# Patient Record
Sex: MALE | NOT HISPANIC OR LATINO | URBAN - METROPOLITAN AREA
[De-identification: names, ages, dates, MRNs, and addresses within clinical notes are randomized per-mention and may not be internally consistent; named-entity substitution may affect disease eponyms.]

---

## 2020-09-21 ENCOUNTER — APPOINTMENT (RX ONLY)
Dept: URBAN - METROPOLITAN AREA OTHER 6 | Facility: OTHER | Age: 70
Setting detail: DERMATOLOGY
End: 2020-09-21

## 2020-09-21 VITALS — TEMPERATURE: 96.8 F

## 2020-09-21 DIAGNOSIS — B86 SCABIES: ICD-10-CM

## 2020-09-21 PROCEDURE — 99202 OFFICE O/P NEW SF 15 MIN: CPT

## 2020-09-21 PROCEDURE — ? COUNSELING

## 2020-09-21 PROCEDURE — ? SCABIES PREP

## 2020-09-21 PROCEDURE — ? PRESCRIPTION

## 2020-09-21 PROCEDURE — 87220 TISSUE EXAM FOR FUNGI: CPT

## 2020-09-21 PROCEDURE — ? TREATMENT REGIMEN

## 2020-09-21 RX ORDER — PERMETHRIN 50 MG/G
CREAM TOPICAL WEEKLY
Qty: 2 | Refills: 1 | Status: ERX | COMMUNITY
Start: 2020-09-21

## 2020-09-21 RX ADMIN — PERMETHRIN: 50 CREAM TOPICAL at 00:00

## 2020-09-21 ASSESSMENT — LOCATION DETAILED DESCRIPTION DERM
LOCATION DETAILED: LEFT SUPERIOR PARIETAL SCALP
LOCATION DETAILED: LEFT CENTRAL MALAR CHEEK

## 2020-09-21 ASSESSMENT — LOCATION ZONE DERM
LOCATION ZONE: FACE
LOCATION ZONE: SCALP

## 2020-09-21 ASSESSMENT — LOCATION SIMPLE DESCRIPTION DERM
LOCATION SIMPLE: LEFT CHEEK
LOCATION SIMPLE: SCALP

## 2020-09-21 NOTE — PROCEDURE: TREATMENT REGIMEN
Plan: Rx Permethrin cream to the neck down at night, leave on for 8 hours then wash off.\\n\\nWe discussed this issue could be nerve-related. Psychiatrist consultation recommended to help with crawling sensations. Patient to keep upcoming appointment with psychiatrist on Wednesday at [facility].
Detail Level: Zone
Discontinue Regimen: Stop tea tree
Samples Given: CeraVe with pramoxine as needed
Otc Regimen: Recommended OTC Sarna anti-itch

## 2020-09-21 NOTE — HPI: LICE (PEDICULOSIS)
How Severe Is It?: moderate
Is This A New Presentation, Or A Follow-Up?: Lice
Additional History: One month ago thought he has been bitten by a bug and thought he had poison ivy and was prescribed oral prednisone for 5 days and hydroxyzine. Then Went to [facility] about two weeks ago and was prescribed topical permethrin cream.\\n[facility] prescribed 2% ketoconazole shampoo about two weeks ago that he used qday however now he is out of refills.\\nHad  check for bed bugs which they did not find.\\nFeels crawling sensations in the brows, scalp and eyebrows “then they go all over my whole body”.\\nLives alone.\\nWorks in the [employer] a lot.\\nApplied the permethrin cream all over the body and rinsed it off 8 hours later.